# Patient Record
Sex: MALE | Race: WHITE | URBAN - METROPOLITAN AREA
[De-identification: names, ages, dates, MRNs, and addresses within clinical notes are randomized per-mention and may not be internally consistent; named-entity substitution may affect disease eponyms.]

---

## 2023-05-02 ENCOUNTER — OFFICE VISIT (OUTPATIENT)
Dept: ORTHOPEDIC SURGERY | Age: 48
End: 2023-05-02
Payer: COMMERCIAL

## 2023-05-02 VITALS — HEIGHT: 70 IN | WEIGHT: 315 LBS | BODY MASS INDEX: 45.1 KG/M2

## 2023-05-02 DIAGNOSIS — M25.561 RIGHT KNEE PAIN, UNSPECIFIED CHRONICITY: Primary | ICD-10-CM

## 2023-05-02 DIAGNOSIS — S83.411A SPRAIN OF MEDIAL COLLATERAL LIGAMENT OF RIGHT KNEE, INITIAL ENCOUNTER: ICD-10-CM

## 2023-05-02 DIAGNOSIS — S83.241A ACUTE MEDIAL MENISCUS TEAR OF RIGHT KNEE, INITIAL ENCOUNTER: ICD-10-CM

## 2023-05-02 PROCEDURE — 99203 OFFICE O/P NEW LOW 30 MIN: CPT | Performed by: ORTHOPAEDIC SURGERY

## 2023-05-02 RX ORDER — SEMAGLUTIDE 1.34 MG/ML
1 INJECTION, SOLUTION SUBCUTANEOUS
COMMUNITY
Start: 2023-04-26 | End: 2024-04-25

## 2023-05-02 NOTE — PROGRESS NOTES
ASSESSMENT/PLAN:  Below is the assessment and plan developed based on review of pertinent history, physical exam, labs, studies, and medications. 1. Right knee pain, unspecified chronicity  -     XR KNEE RT MIN 4 V; Future  2. Sprain of medial collateral ligament of right knee, initial encounter  3. Acute medial meniscus tear of right knee, initial encounter      Return in about 3 weeks (around 5/23/2023). In discussion with the patient, we considered the numerus possible diagnoses that could be contributing to their present symptoms. We also deliberated on the extensive management options that must be considered to treat their current condition. We reviewed their accessible prior medical records, diagnostic tests, and current health and employment information. We considered how these symptoms were affecting the patient´s activities of daily living as well as employment and fitness activities. The patient had various questions regarding the possible risks, benefits, complications, morbidity and mortality regarding their diagnosis and treatment options. The patients´ comorbidities were considered, and I advocated that they consider maximizing lifestyle modification through nutrition and exercise to aid in addressing their symptoms. Shared decision making yielded an understanding to move forward with conservation treatment preferences. The patient expressed understanding that if conservative management fails to alleviate the present symptoms they will return to office for re-evaluation and consideration of additional diagnostic tests and potential surgical options.      In the interim, we have recommended ice, elevation, and take prescription anti-inflammatory medications along with a physician directed home exercise program. We discussed the risks and common side effects of anti-inflammatory medications and instructed the patient to discontinue the medication and contact us if they experienced any side effects. The patient was encouraged to discuss the possible side effects with their family physician or pharmacist prior to initiating any new medications. We discussed the fact that many of the recommended treatment options presented are significantly limited by the patient´s social determinants of health. We also reviewed the circumstances surrounding the environment that they live and work which affect a wide range of health risk. We considered the limited access to appropriate educational resources regarding proper nutrition and exercise as well as the economic and social support necessary to maintain health and wellbeing. Patient has a mild MCL sprain to the right knee versus medial meniscus tear. We discussed the possibility of obtaining an MRI for the definitive diagnosis. Patient feels his pain seems better over the past couple of days. He has elected to move forward with observation. We would like him to return to clinic in 3 weeks for repeat evaluation. If he is still symptomatic or having continued issues we will revisit the discussion      SUBJECTIVE/OBJECTIVE:  Pavel Vinson (: 1975) is a 52 y.o. male, patient,here for evaluation of the Knee Pain (right)    52year old Male presents to the clinic today for evaluation of his right knee. This past  the patient slipped down his stairs. Patient noticed a pain to the inside of the right knee. He has been slowly improving since the fall, but still has some residual soreness to the inside of the knee. He states walking and inline activities are not problematic to him. He has tried pivoting on the right knee because of the femoral discomfort. Also when he goes to lift his leg into bed it is also painful. He denies any feelings of instability to the right knee. He has no history of right knee issues. Denies any numbness or tingling down extremity    PHYSICAL EXAM:    Patient is alert and oriented x3. He is in no acute distress. He walks with a nonantalgic gait. Right knee: No abrasions, lacerations. No effusion. Patient has full range of motion to extension and flexion. He has mild discomfort with full flexion. He has crepitation in the patellofemoral joint that is nonpainful. He has no lateral or medial joint line tenderness. He does have tenderness to palpation over his MCL at its joint line as well as proximal femoral origin. He has no instability with valgus testing, but it is painful. He has a negative anterior and posterior drawer. He is neurovascularly intact distally. IMAGING:    I have independently reviewed and interpreted the following images:     Radiographs of both knees were taken and reviewed in office today. Patient has maintenance of his medial and lateral joint spaces bilaterally. He does have some moderate degenerative changes to the patellofemoral joints bilaterally, left worse than right. No Known Allergies    Current Outpatient Medications   Medication Sig    semaglutide (Ozempic) 1 mg/dose (4 mg/3 mL) pnij 1 mg by SubCUTAneous route every seven (7) days. No current facility-administered medications for this visit. No past medical history on file. Past Surgical History:   Procedure Laterality Date    DC UNLISTED PROCEDURE HANDS/FINGERS         No family history on file.     Social History     Socioeconomic History    Marital status: UNKNOWN     Spouse name: Not on file    Number of children: Not on file    Years of education: Not on file    Highest education level: Not on file   Occupational History    Not on file   Tobacco Use    Smoking status: Never    Smokeless tobacco: Never   Vaping Use    Vaping Use: Never used   Substance and Sexual Activity    Alcohol use: Yes    Drug use: Not Currently    Sexual activity: Not on file   Other Topics Concern    Not on file   Social History Narrative    Not on file     Social Determinants of Health     Financial Resource Strain: Not on file Food Insecurity: Not on file   Transportation Needs: Not on file   Physical Activity: Not on file   Stress: Not on file   Social Connections: Not on file   Intimate Partner Violence: Not on file   Housing Stability: Not on file       Review of Systems    No flowsheet data found. Vitals:  Ht 5' 10\" (1.778 m)   Wt (!) 415 lb (188.2 kg)   BMI 59.55 kg/m²    Body mass index is 59.55 kg/m². An electronic signature was used to authenticate this note.   -- Michele Blackwood MD